# Patient Record
Sex: FEMALE | Race: WHITE
[De-identification: names, ages, dates, MRNs, and addresses within clinical notes are randomized per-mention and may not be internally consistent; named-entity substitution may affect disease eponyms.]

---

## 2017-10-24 ENCOUNTER — HOSPITAL ENCOUNTER (EMERGENCY)
Dept: HOSPITAL 61 - ER | Age: 32
LOS: 1 days | Discharge: HOME | End: 2017-10-25
Payer: COMMERCIAL

## 2017-10-24 VITALS — SYSTOLIC BLOOD PRESSURE: 120 MMHG | DIASTOLIC BLOOD PRESSURE: 75 MMHG

## 2017-10-24 VITALS — BODY MASS INDEX: 43.43 KG/M2 | HEIGHT: 62 IN | WEIGHT: 236 LBS

## 2017-10-24 DIAGNOSIS — R42: Primary | ICD-10-CM

## 2017-10-24 DIAGNOSIS — G93.2: ICD-10-CM

## 2017-10-24 PROCEDURE — 70450 CT HEAD/BRAIN W/O DYE: CPT

## 2017-10-24 PROCEDURE — 81025 URINE PREGNANCY TEST: CPT

## 2017-10-24 NOTE — RAD
CT scan of the head without contrast 10/24/2017

 

Clinical History: Dizziness for 4 days..

 

Technique: Unenhanced, contiguous, 5 mm axial sections were obtained 

through the head. One or more of the following individualized dose 

reduction techniques were utilized for this study:

 

1. Automated exposure control.

2. Adjustment of the mA and/or kV according to patient size.

3. Use of iterative reconstruction technique.

 

 

 

Findings: The ventricles and sulci are within normal limits in size and 

configuration. No focal area of abnormal attenuation is seen involving the

brain parenchyma. No extra-axial fluid collection is seen. No skull 

fracture is seen.

 

Impression: Negative study.

 

Electronically signed by: Suresh Mojica MD (10/24/2017 11:04 PM) [facility]

## 2017-10-25 NOTE — PHYS DOC
Past Medical History


Past Medical History:  UTI, Other


Additional Past Medical Histor:  swollen optic nerve with increased CSF; NO 

sense of smell


Past Surgical History:  Other


Additional Past Surgical Histo:  L knee, R foot; nasal


Alcohol Use:  Rarely


Drug Use:  None





Adult General


Chief Complaint


Chief Complaint:  DIZZY/LIGHT HEADED





HPI


HPI





Patient is a 32  year old female with hx of pseudotumor cerebri and vertigo.  

sx worsening over past few days.  vertigo worse today.  has meclizine from 

urgent care last week.   she feels "vibrations" in her head "between her ears".

  no blurred vision, no pain. +spinning sensation.  she had a MRI and spinal 

tap about 4-6 months ago. her opening pressure was "borderline" she was told.  

no recent illnesses. no fever, no traumas





Review of Systems


Review of Systems





Constitutional: Denies fever or chills []


Eyes: Denies change in visual acuity, redness, or eye pain []


HENT: Denies nasal congestion or sore throat  +head pressure, denies pain


Respiratory: Denies cough or shortness of breath []


Cardiovascular: No additional information not addressed in HPI []


GI: Denies abdominal pain, nausea, vomiting, bloody stools or diarrhea []


: Denies dysuria or hematuria []


Musculoskeletal: Denies back pain or joint pain []


Integument: Denies rash or skin lesions []


Neurologic: Denies headache, focal weakness or sensory changes []


Endocrine: Denies polyuria or polydipsia []





Allergies


Allergies





Allergies








Coded Allergies Type Severity Reaction Last Updated Verified


 


  No Known Drug Allergies    10/24/17 No











Physical Exam


Physical Exam





Constitutional: Well developed, well nourished, no acute distress, non-toxic 

appearance. []


HENT: Normocephalic, atraumatic, bilateral external ears normal, oropharynx 

moist, no oral exudates, nose normal. []


Eyes: PERRLA, EOMI, conjunctiva normal, no discharge. [] 


Neck: Normal range of motion, no tenderness, supple, no stridor. [] 


Cardiovascular:Heart rate regular rhythm, no murmur []


Lungs & Thorax:  Bilateral breath sounds clear to auscultation []


Abdomen: Bowel sounds normal, soft, no tenderness, no masses, no pulsatile 

masses. [] 


Skin: Warm, dry, no erythema, no rash. [] 


Back: No tenderness, no CVA tenderness. [] 


Extremities: No tenderness, no cyanosis, no clubbing, ROM intact, no edema. [] 


Neurologic: Alert and oriented X 3, normal motor function, normal sensory 

function, no focal deficits noted. []


Psychologic: Affect normal, judgement normal, mood normal. []





Current Patient Data


Vital Signs





 Vital Signs








  Date Time  Temp Pulse Resp B/P (MAP) Pulse Ox O2 Delivery O2 Flow Rate FiO2


 


10/24/17 23:08  96 20  98   


 


10/24/17 21:38 98.2   132/78 (96)  Room Air  





 98.2       








Lab Values





 Laboratory Tests








Test


  10/24/17


21:43


 


POC Urine HCG, Qualitative


  Hcg negative


(Negative)











EKG


EKG


[]





Radiology/Procedures


Radiology/Procedures


ct neg for dilated ventricles[]





Course & Med Decision Making


Course & Med Decision Making


Pertinent Labs and Imaging studies reviewed. (See chart for details)





CT is neg for dilated ventricles.  pt has diamox at home. rewrote prescription 

in case she couldn't find it.  did not LP since last LP was only borderline for 

high pressure.  she improved with diamox last time. she can f/u with neurology.

  she has meclizine for vertigo





Dragon Disclaimer


Dragon Disclaimer


This electronic medical record was generated, in whole or in part, using a 

voice recognition dictation system.





Departure


Departure


Disposition:  01 HOME, SELF-CARE


Condition:  GOOD


Referrals:  


UNKNOWN PCP NAME (PCP)


Patient Instructions:  Vertigo, Easy-to-Read





Additional Instructions:  


restart your diamox.  continue meclizine. call your neurologist in the morning 

for a follow-up appointment. return if symptoms worsen


Scripts


Acetazolamide (DIAMOX SEQUELS) 500 Mg Capsule.er


500 MG PO DAILY for 14 Days, #14 CAP.SR


   Prov: VENKAT PLEITEZ MD         10/25/17











VENKAT PLEITEZ MD Oct 25, 2017 00:20

## 2018-02-28 ENCOUNTER — HOSPITAL ENCOUNTER (EMERGENCY)
Dept: HOSPITAL 61 - ER | Age: 33
LOS: 1 days | Discharge: HOME | End: 2018-03-01
Payer: COMMERCIAL

## 2018-02-28 DIAGNOSIS — Z3A.14: ICD-10-CM

## 2018-02-28 DIAGNOSIS — O26.892: ICD-10-CM

## 2018-02-28 DIAGNOSIS — B96.89: ICD-10-CM

## 2018-02-28 DIAGNOSIS — O23.592: Primary | ICD-10-CM

## 2018-02-28 DIAGNOSIS — N76.0: ICD-10-CM

## 2018-02-28 DIAGNOSIS — K59.00: ICD-10-CM

## 2018-02-28 DIAGNOSIS — O16.2: ICD-10-CM

## 2018-02-28 PROCEDURE — 96374 THER/PROPH/DIAG INJ IV PUSH: CPT

## 2018-02-28 PROCEDURE — 96361 HYDRATE IV INFUSION ADD-ON: CPT

## 2018-02-28 PROCEDURE — 86901 BLOOD TYPING SEROLOGIC RH(D): CPT

## 2018-02-28 PROCEDURE — 80053 COMPREHEN METABOLIC PANEL: CPT

## 2018-02-28 PROCEDURE — 99285 EMERGENCY DEPT VISIT HI MDM: CPT

## 2018-02-28 PROCEDURE — 83735 ASSAY OF MAGNESIUM: CPT

## 2018-02-28 PROCEDURE — 76817 TRANSVAGINAL US OBSTETRIC: CPT

## 2018-02-28 PROCEDURE — 81001 URINALYSIS AUTO W/SCOPE: CPT

## 2018-02-28 PROCEDURE — 86900 BLOOD TYPING SEROLOGIC ABO: CPT

## 2018-02-28 PROCEDURE — 36415 COLL VENOUS BLD VENIPUNCTURE: CPT

## 2018-02-28 PROCEDURE — 96376 TX/PRO/DX INJ SAME DRUG ADON: CPT

## 2018-02-28 PROCEDURE — 87591 N.GONORRHOEAE DNA AMP PROB: CPT

## 2018-02-28 PROCEDURE — 81025 URINE PREGNANCY TEST: CPT

## 2018-02-28 PROCEDURE — 87086 URINE CULTURE/COLONY COUNT: CPT

## 2018-02-28 PROCEDURE — 85025 COMPLETE CBC W/AUTO DIFF WBC: CPT

## 2018-02-28 PROCEDURE — 85007 BL SMEAR W/DIFF WBC COUNT: CPT

## 2018-02-28 PROCEDURE — 96375 TX/PRO/DX INJ NEW DRUG ADDON: CPT

## 2018-02-28 PROCEDURE — 87491 CHLMYD TRACH DNA AMP PROBE: CPT

## 2018-02-28 PROCEDURE — 76805 OB US >/= 14 WKS SNGL FETUS: CPT

## 2018-02-28 PROCEDURE — 86850 RBC ANTIBODY SCREEN: CPT

## 2018-03-01 LAB
% LYMPHS: 11 % (ref 24–48)
% SEGS: 89 % (ref 35–66)
ADD MAN DIFF?: YES
ALBUMIN SERPL-MCNC: 3.2 G/DL (ref 3.4–5)
ALBUMIN/GLOB SERPL: 0.9 {RATIO} (ref 1–1.7)
ALP SERPL-CCNC: 61 U/L (ref 46–116)
ALT (SGPT): 20 U/L (ref 14–59)
ANION GAP SERPL CALC-SCNC: 13 MMOL/L (ref 6–14)
AST SERPL-CCNC: 14 U/L (ref 15–37)
BACTERIA,URINE: (no result) /HPF
BASO #: 0.1 X10^3/UL (ref 0–0.2)
BASO %: 0 % (ref 0–3)
BILIRUBIN,URINE: NEGATIVE
BLOOD UREA NITROGEN: 12 MG/DL (ref 7–20)
BUN/CREAT SERPL: 13 (ref 6–20)
CALCIUM: 8.7 MG/DL (ref 8.5–10.1)
CHLORIDE: 105 MMOL/L (ref 98–107)
CLARITY,URINE: CLEAR
CO2 SERPL-SCNC: 21 MMOL/L (ref 21–32)
COLOR,URINE: YELLOW
CREAT SERPL-MCNC: 0.9 MG/DL (ref 0.6–1)
EOS #: 0 X10^3/UL (ref 0–0.7)
EOS %: 0 % (ref 0–3)
GFR SERPLBLD BASED ON 1.73 SQ M-ARVRAT: 73.5 ML/MIN
GLOBULIN SER-MCNC: 3.7 G/DL (ref 2.2–3.8)
GLUCOSE SERPL-MCNC: 153 MG/DL (ref 70–99)
GLUCOSE,URINE: NEGATIVE MG/DL
HCG SERPL-ACNC: 15.1 X10^3/UL (ref 4–11)
HEMATOCRIT: 39.5 % (ref 36–47)
HEMOGLOBIN: 13.8 G/DL (ref 12–15.5)
LYMPH #: 1.3 X10^3/UL (ref 1–4.8)
LYMPH %: 9 % (ref 24–48)
MAGNESIUM: 1.9 MG/DL (ref 1.8–2.4)
MEAN CORPUSCULAR HEMOGLOBIN: 31 PG (ref 25–35)
MEAN CORPUSCULAR HGB CONC: 35 G/DL (ref 31–37)
MEAN CORPUSCULAR VOLUME: 88 FL (ref 79–100)
MONO #: 0.4 X10^3/UL (ref 0–1.1)
MONO %: 3 % (ref 0–9)
NEUT #: 13.3 X10^3UL (ref 1.8–7.7)
NEUT %: 88 % (ref 31–73)
NITRITE,URINE: NEGATIVE
PH,URINE: 5.5
PLATELET COUNT: 449 X10^3/UL (ref 140–400)
PLT ESTIMATE: (no result)
POTASSIUM SERPL-SCNC: 3.5 MMOL/L (ref 3.5–5.1)
PROTEIN,URINE: NEGATIVE MG/DL
RBC,URINE: (no result) /HPF (ref 0–2)
RED BLOOD COUNT: 4.52 X10^6/UL (ref 3.5–5.4)
RED CELL DISTRIBUTION WIDTH: 14 % (ref 11.5–14.5)
SODIUM: 139 MMOL/L (ref 136–145)
SPECIFIC GRAVITY,URINE: 1.02
SQUAMOUS EPITHELIAL CELL,UR: (no result) /LPF
TOTAL BILIRUBIN: 0.2 MG/DL (ref 0.2–1)
TOTAL PROTEIN: 6.9 G/DL (ref 6.4–8.2)
URINE HCG POC: (no result)
UROBILINOGEN,URINE: 0.2 MG/DL
WBC,URINE: (no result) /HPF (ref 0–4)

## 2018-03-01 RX ADMIN — FENTANYL CITRATE 1 MCG: 50 INJECTION INTRAMUSCULAR; INTRAVENOUS at 00:45

## 2018-03-01 RX ADMIN — FENTANYL CITRATE 1 MCG: 50 INJECTION INTRAMUSCULAR; INTRAVENOUS at 01:16

## 2018-03-01 RX ADMIN — FENTANYL CITRATE 1 MCG: 50 INJECTION INTRAMUSCULAR; INTRAVENOUS at 03:45

## 2018-03-01 RX ADMIN — FENTANYL CITRATE 1 MCG: 50 INJECTION INTRAMUSCULAR; INTRAVENOUS at 02:45

## 2018-03-01 RX ADMIN — ONDANSETRON 1 MG: 2 INJECTION INTRAMUSCULAR; INTRAVENOUS at 01:16

## 2018-03-01 RX ADMIN — BACITRACIN 1 MLS/HR: 5000 INJECTION, POWDER, FOR SOLUTION INTRAMUSCULAR at 00:35

## 2018-03-10 ENCOUNTER — HOSPITAL ENCOUNTER (EMERGENCY)
Dept: HOSPITAL 61 - ER | Age: 33
Discharge: HOME | End: 2018-03-10
Payer: COMMERCIAL

## 2018-03-10 DIAGNOSIS — Z3A.15: ICD-10-CM

## 2018-03-10 DIAGNOSIS — O99.612: Primary | ICD-10-CM

## 2018-03-10 DIAGNOSIS — O16.2: ICD-10-CM

## 2018-03-10 DIAGNOSIS — K92.0: ICD-10-CM

## 2018-03-10 PROCEDURE — 99283 EMERGENCY DEPT VISIT LOW MDM: CPT
